# Patient Record
Sex: FEMALE | Race: WHITE | URBAN - METROPOLITAN AREA
[De-identification: names, ages, dates, MRNs, and addresses within clinical notes are randomized per-mention and may not be internally consistent; named-entity substitution may affect disease eponyms.]

---

## 2021-09-28 ENCOUNTER — TELEPHONE (OUTPATIENT)
Dept: DERMATOLOGY | Facility: CLINIC | Age: 76
End: 2021-09-28

## 2021-09-28 NOTE — TELEPHONE ENCOUNTER
[facility] Call Center    Phone Message    May a detailed message be left on voicemail: yes, on home phone only    Reason for Call: Other: `      Pt's  calling to ask if [facility] does SRT laser treatment for lesions?    Please call Pt to advise. Thanks!    Action Taken: Other: [facility]    Travel Screening: Not Applicable

## 2021-09-28 NOTE — TELEPHONE ENCOUNTER
Spoke to patient who is asking if we do radiation for BCC on her nose?...     I did advise we do not have a laser for radiation at this clinic, but do perform Mohs surgery here.    Patient verbalized understanding. Carmen Bosch RN